# Patient Record
Sex: MALE | Race: BLACK OR AFRICAN AMERICAN
[De-identification: names, ages, dates, MRNs, and addresses within clinical notes are randomized per-mention and may not be internally consistent; named-entity substitution may affect disease eponyms.]

---

## 2017-04-13 ENCOUNTER — HOSPITAL ENCOUNTER (EMERGENCY)
Dept: HOSPITAL 62 - ER | Age: 37
Discharge: HOME | End: 2017-04-13
Payer: COMMERCIAL

## 2017-04-13 VITALS — DIASTOLIC BLOOD PRESSURE: 95 MMHG | SYSTOLIC BLOOD PRESSURE: 132 MMHG

## 2017-04-13 DIAGNOSIS — F17.210: ICD-10-CM

## 2017-04-13 DIAGNOSIS — M25.511: Primary | ICD-10-CM

## 2017-04-13 PROCEDURE — 99283 EMERGENCY DEPT VISIT LOW MDM: CPT

## 2017-04-13 PROCEDURE — 73030 X-RAY EXAM OF SHOULDER: CPT
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
## 2019-01-12 ENCOUNTER — HOSPITAL ENCOUNTER (EMERGENCY)
Dept: HOSPITAL 62 - ER | Age: 39
Discharge: HOME | End: 2019-01-12
Payer: SELF-PAY

## 2019-01-12 VITALS — SYSTOLIC BLOOD PRESSURE: 137 MMHG | DIASTOLIC BLOOD PRESSURE: 88 MMHG

## 2019-01-12 DIAGNOSIS — L03.811: Primary | ICD-10-CM

## 2019-01-12 DIAGNOSIS — H60.02: ICD-10-CM

## 2019-01-12 DIAGNOSIS — F17.200: ICD-10-CM

## 2019-01-12 PROCEDURE — 99282 EMERGENCY DEPT VISIT SF MDM: CPT

## 2019-01-12 NOTE — ER DOCUMENT REPORT
ED General





- General


Chief Complaint: Abscess


Stated Complaint: ABSCESS


Time Seen by Provider: 01/12/19 08:44


TRAVEL OUTSIDE OF THE [place] IN LAST 30 DAYS: No





- HPI


Patient complains to provider of: Abscess


Notes: 





Patient coming in for evaluation of an abscess behind the left ear.  Patient 

states a few days ago noticed a little knot behind the left ear patient states 

he continue to grow states last night his wife pressed on the wound causing and 

expressing pus to come out patient states exquisitely tender earlier this 

morning and now is red and warm.  Patient denies any fevers chills nausea 

vomiting diarrhea.  Patient denies history of MRSA in the past.  Patient denies 

any trauma to the area denies any ear pain patient resting comfortably upon my 

evaluation





- Related Data


Allergies/Adverse Reactions: 


                                        





No Known Allergies Allergy (Verified 01/12/19 08:17)


   











Past Medical History





- Social History


Smoking Status: Current Every Day Smoker


Chew tobacco use (# tins/day): No


Frequency of alcohol use: Social


Drug Abuse: None


Family History: Arthritis, CAD, CVA, Hyperlipidemia, Hypertension


Patient has suicidal ideation: No


Patient has homicidal ideation: No


Renal/ Medical History: Denies: Hx Peritoneal Dialysis





- Immunizations


Immunizations up to date: No


Hx Diphtheria, Pertussis, Tetanus Vaccination: No





Review of Systems





- Review of Systems


Constitutional: No symptoms reported


EENT: Other - Abscess


Cardiovascular: No symptoms reported


Respiratory: No symptoms reported


Gastrointestinal: No symptoms reported


Genitourinary: No symptoms reported


Male Genitourinary: No symptoms reported


Musculoskeletal: No symptoms reported


Skin: No symptoms reported


Hematologic/Lymphatic: No symptoms reported


Neurological/Psychological: No symptoms reported


-: Yes All other systems reviewed and negative





Physical Exam





- Vital signs


Vitals: 


                                        











Temp Pulse Resp BP Pulse Ox


 


 97.6 F   96   18   137/88 H  99 


 


 01/12/19 08:21  01/12/19 08:21  01/12/19 08:21  01/12/19 08:21  01/12/19 08:21











Interpretation: Normal





- General


General appearance: Appears well, Alert





- HEENT


Head: Normocephalic, Atraumatic


Eyes: Normal


Conjunctiva: Normal


Cornea: Normal


Eyelashes: Normal


Pupils: PERRL


Ears: Normal, Other - Behind the left ear around the postauricular area there is

significant erythema and tenderness to palpation there is signs of drainage from

it looks like to be possible lymph node versus abscess bedside ultrasound was 

performed showing no fluid collection that is drainable at this time


External canal: Normal


Tympanic membrane: Normal


Sinus: Normal


Nasal: Normal


Mouth/Lips: Normal


Mucous membranes: Normal


Pharynx: Normal


Neck: Normal.  No: Lymphadenopathy


Notes: 





Examination of the patient's oral cavity reveals severe dental disease with 

multiple dental caries





- Respiratory


Respiratory status: No respiratory distress


Chest status: Nontender


Breath sounds: Normal


Chest palpation: Normal





- Cardiovascular


Rhythm: Regular


Heart sounds: Normal auscultation


Murmur: No





- Abdominal


Inspection: Normal


Distension: No distension


Bowel sounds: Normal


Tenderness: Nontender


Organomegaly: No organomegaly





- Back


Back: Normal, Nontender





- Extremities


General upper extremity: Normal inspection, Nontender, Normal color, Normal ROM,

Normal temperature


General lower extremity: Normal inspection, Nontender, Normal color, Normal ROM,

Normal temperature, Normal weight bearing.  No: Kate's sign





- Neurological


Neuro grossly intact: Yes


Cognition: Normal


Orientation: AAOx4


[place] Coma Scale Eye Opening: Spontaneous


[place] Coma Scale Verbal: Oriented


[place] Coma Scale Motor: Obeys Commands


[place] Coma Scale Total: 15


Speech: Normal


Motor strength normal: LUE, RUE, LLE, RLE


Sensory: Normal





- Psychological


Associated symptoms: Normal affect, Normal mood





- Skin


Skin Temperature: Warm


Skin Moisture: Dry


Skin Color: Normal





Course





- Re-evaluation


Re-evalutation: 





01/12/19 09:43


Patient's history is consistent with an abscess versus an infected lymph node in

the postauricular area patient now has significant cellulitis of the area will 

give the patient a shot of Rocephin will start the patient on clindamycin for 

antibiotic coverage.  Otherwise examination is normal patient will be discharged

home follow-up primary care physician.





- Vital Signs


Vital signs: 


                                        











Temp Pulse Resp BP Pulse Ox


 


 97.6 F   96   18   137/88 H  99 


 


 01/12/19 08:21  01/12/19 08:21  01/12/19 08:21  01/12/19 08:21  01/12/19 08:21














Discharge





- Discharge


Clinical Impression: 


 Cellulitis and abscess of head





Condition: Good


Disposition: HOME, SELF-CARE


Instructions:  Abscess ([facility]), Cellulitis ([facility]), Clindamycin ([facility]), Oral Narcotic

Medication ([facility])


Additional Instructions: 


More likely he had an abscess last night that your wife drained pus out of them 

now have a skin infection or cellulitis we will treat this with antibiotic 

called clindamycin.  I will highly recommend she follow-up with your primary 

care physician return to the ER symptoms worsen.  You may take Tylenol and 

Motrin for your pain control Ultram for severe pain you may place warm packs to 

the area to help out with pain control as well.


Prescriptions: 


Ibuprofen [Motrin 600 mg Tablet] 600 mg PO Q8HP PRN #21 tablet


 PRN Reason: 


Clindamycin HCl [Cleocin 150 mg Capsule] 150 mg PO Q6 #40 capsule


Tramadol HCl [Ultram 50 mg Tablet] 50 mg PO ASDIR PRN #10 tablet


 PRN Reason: 


Forms:  Return to Work

## 2019-12-09 ENCOUNTER — HOSPITAL ENCOUNTER (OUTPATIENT)
Dept: HOSPITAL 62 - ER | Age: 39
Setting detail: OBSERVATION
LOS: 1 days | Discharge: HOME | End: 2019-12-10
Attending: INTERNAL MEDICINE | Admitting: INTERNAL MEDICINE
Payer: COMMERCIAL

## 2019-12-09 DIAGNOSIS — F19.11: ICD-10-CM

## 2019-12-09 DIAGNOSIS — Z23: ICD-10-CM

## 2019-12-09 DIAGNOSIS — R07.1: Primary | ICD-10-CM

## 2019-12-09 DIAGNOSIS — Z87.891: ICD-10-CM

## 2019-12-09 DIAGNOSIS — I25.10: ICD-10-CM

## 2019-12-09 DIAGNOSIS — F41.9: ICD-10-CM

## 2019-12-09 DIAGNOSIS — R06.4: ICD-10-CM

## 2019-12-09 DIAGNOSIS — Z95.5: ICD-10-CM

## 2019-12-09 DIAGNOSIS — Z79.02: ICD-10-CM

## 2019-12-09 DIAGNOSIS — Z82.49: ICD-10-CM

## 2019-12-09 DIAGNOSIS — Z86.74: ICD-10-CM

## 2019-12-09 DIAGNOSIS — D72.829: ICD-10-CM

## 2019-12-09 DIAGNOSIS — I25.2: ICD-10-CM

## 2019-12-09 DIAGNOSIS — Z79.82: ICD-10-CM

## 2019-12-09 DIAGNOSIS — I10: ICD-10-CM

## 2019-12-09 LAB
ADD MANUAL DIFF: NO
ALBUMIN SERPL-MCNC: 5.1 G/DL (ref 3.5–5)
ALP SERPL-CCNC: 98 U/L (ref 38–126)
ANION GAP SERPL CALC-SCNC: 18 MMOL/L (ref 5–19)
APPEARANCE UR: CLEAR
APTT PPP: (no result) S
AST SERPL-CCNC: 45 U/L (ref 17–59)
BARBITURATES UR QL SCN: NEGATIVE
BASOPHILS # BLD AUTO: 0.4 10^3/UL (ref 0–0.2)
BASOPHILS NFR BLD AUTO: 2.5 % (ref 0–2)
BILIRUB DIRECT SERPL-MCNC: 0.2 MG/DL (ref 0–0.4)
BILIRUB SERPL-MCNC: 0.7 MG/DL (ref 0.2–1.3)
BILIRUB UR QL STRIP: NEGATIVE
BUN SERPL-MCNC: 15 MG/DL (ref 7–20)
CALCIUM: 10.5 MG/DL (ref 8.4–10.2)
CHLORIDE SERPL-SCNC: 99 MMOL/L (ref 98–107)
CO2 SERPL-SCNC: 23 MMOL/L (ref 22–30)
EOSINOPHIL # BLD AUTO: 0.5 10^3/UL (ref 0–0.6)
EOSINOPHIL NFR BLD AUTO: 3.5 % (ref 0–6)
ERYTHROCYTE [DISTWIDTH] IN BLOOD BY AUTOMATED COUNT: 15.8 % (ref 11.5–14)
GLUCOSE SERPL-MCNC: 86 MG/DL (ref 75–110)
GLUCOSE UR STRIP-MCNC: NEGATIVE MG/DL
HCT VFR BLD CALC: 44.9 % (ref 37.9–51)
HGB BLD-MCNC: 15.4 G/DL (ref 13.5–17)
KETONES UR STRIP-MCNC: NEGATIVE MG/DL
LYMPHOCYTES # BLD AUTO: 4.2 10^3/UL (ref 0.5–4.7)
LYMPHOCYTES NFR BLD AUTO: 29.8 % (ref 13–45)
MCH RBC QN AUTO: 36.9 PG (ref 27–33.4)
MCHC RBC AUTO-ENTMCNC: 34.3 G/DL (ref 32–36)
MCV RBC AUTO: 108 FL (ref 80–97)
METHADONE UR QL SCN: NEGATIVE
MONOCYTES # BLD AUTO: 0.8 10^3/UL (ref 0.1–1.4)
MONOCYTES NFR BLD AUTO: 5.8 % (ref 3–13)
NEUTROPHILS # BLD AUTO: 8.2 10^3/UL (ref 1.7–8.2)
NEUTS SEG NFR BLD AUTO: 58.4 % (ref 42–78)
NITRITE UR QL STRIP: NEGATIVE
PCP UR QL SCN: NEGATIVE
PH UR STRIP: 6 [PH] (ref 5–9)
PLATELET # BLD: 434 10^3/UL (ref 150–450)
POTASSIUM SERPL-SCNC: 4 MMOL/L (ref 3.6–5)
PROT SERPL-MCNC: 8.5 G/DL (ref 6.3–8.2)
PROT UR STRIP-MCNC: NEGATIVE MG/DL
RBC # BLD AUTO: 4.18 10^6/UL (ref 4.35–5.55)
SP GR UR STRIP: 1.01
TOTAL CELLS COUNTED % (AUTO): 100 %
URINE AMPHETAMINES SCREEN: NEGATIVE
URINE BENZODIAZEPINES SCREEN: NEGATIVE
URINE COCAINE SCREEN: NEGATIVE
URINE MARIJUANA (THC) SCREEN: NEGATIVE
UROBILINOGEN UR-MCNC: NEGATIVE MG/DL (ref ?–2)
WBC # BLD AUTO: 14.1 10^3/UL (ref 4–10.5)

## 2019-12-09 PROCEDURE — 85025 COMPLETE CBC W/AUTO DIFF WBC: CPT

## 2019-12-09 PROCEDURE — 36415 COLL VENOUS BLD VENIPUNCTURE: CPT

## 2019-12-09 PROCEDURE — 93010 ELECTROCARDIOGRAM REPORT: CPT

## 2019-12-09 PROCEDURE — 96374 THER/PROPH/DIAG INJ IV PUSH: CPT

## 2019-12-09 PROCEDURE — 71045 X-RAY EXAM CHEST 1 VIEW: CPT

## 2019-12-09 PROCEDURE — 99285 EMERGENCY DEPT VISIT HI MDM: CPT

## 2019-12-09 PROCEDURE — 80053 COMPREHEN METABOLIC PANEL: CPT

## 2019-12-09 PROCEDURE — 93005 ELECTROCARDIOGRAM TRACING: CPT

## 2019-12-09 PROCEDURE — 90686 IIV4 VACC NO PRSV 0.5 ML IM: CPT

## 2019-12-09 PROCEDURE — 84484 ASSAY OF TROPONIN QUANT: CPT

## 2019-12-09 PROCEDURE — 96375 TX/PRO/DX INJ NEW DRUG ADDON: CPT

## 2019-12-09 PROCEDURE — G0378 HOSPITAL OBSERVATION PER HR: HCPCS

## 2019-12-09 PROCEDURE — 80307 DRUG TEST PRSMV CHEM ANLYZR: CPT

## 2019-12-09 PROCEDURE — 85652 RBC SED RATE AUTOMATED: CPT

## 2019-12-09 PROCEDURE — 84443 ASSAY THYROID STIM HORMONE: CPT

## 2019-12-09 PROCEDURE — 93306 TTE W/DOPPLER COMPLETE: CPT

## 2019-12-09 PROCEDURE — 81001 URINALYSIS AUTO W/SCOPE: CPT

## 2019-12-09 RX ADMIN — ACETAMINOPHEN PRN MG: 325 TABLET ORAL at 17:04

## 2019-12-09 RX ADMIN — ASPIRIN SCH MG: 81 TABLET, COATED ORAL at 10:05

## 2019-12-09 RX ADMIN — METOPROLOL TARTRATE SCH MG: 25 TABLET, FILM COATED ORAL at 21:37

## 2019-12-09 RX ADMIN — ASPIRIN 325 MG ORAL TABLET PRN MG: 325 PILL ORAL at 21:37

## 2019-12-09 RX ADMIN — Medication SCH: at 15:02

## 2019-12-09 RX ADMIN — CLOPIDOGREL BISULFATE SCH MG: 75 TABLET, FILM COATED ORAL at 10:05

## 2019-12-09 NOTE — EKG REPORT
SEVERITY:- BORDERLINE ECG -

SINUS RHYTHM

PROBABLE LEFT ATRIAL ABNORMALITY

BORDERLINE PROLONGED QT INTERVAL

BORDERLINE ID INTERVAL.

:

Confirmed by: Mateo Means MD 09-Dec-2019 08:08:22

## 2019-12-09 NOTE — RADIOLOGY REPORT (SQ)
CLINICAL HISTORY:  chest pain 



COMPARISON: None.



TECHNIQUE: XR CHEST 1 VIEW 12/9/2019 1:11 AM CST



FINDINGS: 



Cardiac silhouette is normal in size. Lungs are clear without

consolidation, atelectasis, mass or edema. There is no pleural

effusion. There is no pneumothorax. There are no acute osseous

findings.



IMPRESSION: 



Clear lungs.

## 2019-12-09 NOTE — ER DOCUMENT REPORT
ED Cardiac





- General


Stated Complaint: CHEST PAIN,SHORT OF BREATH


Time Seen by Provider: 12/09/19 01:05


Notes: 


Patient is a 39-year-old male that comes emergency department for chief 

complaint of chest pain.  He states chest pain started in the middle of his 

chest and towards the left side of his chest about 20 minutes prior to arrival, 

he states that the pain is making him feel short of breath and he started 

hyperventilating.  He denies nausea or vomiting, cough, fever/chills, injury.  

He states he just quit smoking.  Past medical history of hypertension and he is 

also reportedly on Plavix and aspirin, he states that he had a cardiac cath and 

stent performed at [facility] at the end of November.  He denies medical 

history otherwise.





TRAVEL OUTSIDE OF THE [place] IN LAST 30 DAYS: No





- Related Data


Allergies/Adverse Reactions: 


                                        





No Known Allergies Allergy (Verified 01/12/19 08:17)


   











Past Medical History





- General


Information source: Patient





- Social History


Smoking Status: Former Smoker


Lives with: Other - Incarcerated


Family History: Arthritis, CAD, CVA, Hyperlipidemia, Hypertension





- Past Medical History


Cardiac Medical History: Reports: Hx Coronary Artery Disease, Hx Heart Attack, 

Hx Hypertension


Renal/ Medical History: Denies: Hx Peritoneal Dialysis


Past Surgical History: Reports: Hx Cardiac Catheterization - Cardiac 

catheterization with drug-eluting stent in November 2019





- Immunizations


Hx Diphtheria, Pertussis, Tetanus Vaccination: Yes





Review of Systems





- Review of Systems


Constitutional: No symptoms reported


EENT: No symptoms reported


Cardiovascular: See HPI


Respiratory: See HPI


Gastrointestinal: No symptoms reported


Genitourinary: No symptoms reported


Male Genitourinary: No symptoms reported


Musculoskeletal: No symptoms reported


Skin: No symptoms reported


Hematologic/Lymphatic: No symptoms reported


Neurological/Psychological: See HPI





Physical Exam





- Vital signs


Vitals: 


                                        











Temp Pulse Resp Pulse Ox


 


 97.4 F   79   22 H  100 


 


 12/09/19 01:12  12/09/19 01:12  12/09/19 01:12  12/09/19 01:12














- Notes


Notes: 





GENERAL: Alert, responsive, hyperventilating and anxious appearing


HEAD: Normocephalic, atraumatic.


EYES: Pupils equal, round, and reactive to light. Extraocular movements intact.


ENT: Oral mucosa moist, tongue midline. Oropharynx unremarkable. Airway patent. 

Nares patent, no nasal septal hematoma, TM's intact.


NECK: Full range of motion. Supple. Trachea midline.


LUNGS: Clear to auscultation bilaterally, no wheezes, rales, or rhonchi.  

Hyperventilating.  There is some mild tenderness along the parasternal areas 

bilaterally without erythema, swelling, or severe tenderness.


HEART: Regular rate and rhythm. No murmur


ABDOMEN: Soft, non-tender. Non-distended. 


EXTREMITIES: Moves all 4 extremities spontaneously. No edema, normal radial and 

dorsalis pedis pulses bilaterally. No cyanosis.


BACK: no cervical, thoracic, lumbar midline tenderness. No saddle anesthesia, 

normal distal neurovascular exam. Moves all extremities in full range of motion.


NEUROLOGICAL: Alert and oriented x3. Normal speech. Cranial nerves II through 

XII grossly intact. 


PSYCH: Hyperventilating and anxious appearing


SKIN: Warm, dry, normal turgor. No rashes or lesions noted.





Course





- Re-evaluation


Re-evalutation: 


Patient initially hyperventilating, appears anxious and states he is feeling s

harp pain in the chest radiating to the left side.  Patient given aspirin, work-

up initiated, will closely reevaluate.  His vital signs are normal.





EKG without concerning findings, chest x-ray unremarkable.  I reevaluated 

patient, he is much calmer, pain is almost resolved, he is no longer hyperven

tilating.  He states he became anxious because of his recent experience with his

heart.  





Troponin negative.  CBC shows mild leukocytosis, nonspecific.  Chemistry 

unremarkable.  I did obtain records from [facility], this does confirm that 

patient had a STEMI, V. fib arrest, 2 minutes of chest compressions, and had a 

cardiac catheterization with a stent placed.  He states that he missed the [facility]

low-up to cardiology because he was told it would be $300.  He states pain is 

still minimally there.  He does also have some parasternal chest wall tenderness

although this is hard to say that it is the same pain he was feeling because 

pain was mainly spreading out over the left side of the chest when he felt it.  

Discussed with Dr. Carey.  Because of his recent cardiac history 

recommendation is to admit to telemetry observation.





After the small dose of morphine the left-sided radiating pain is resolved, only

the chest wall pain remains and this is mild and mainly with palpation.  Patient

does distinguish between the two.





Discussed with Dr. Baez, he recommends repeat troponin.





Discussed with Dr. Baez again, second troponin negative, patient has been 

reevaluated bedside without change, patient accepted to telemetry observation.





- Vital Signs


Vital signs: 


                                        











Temp Pulse Resp BP Pulse Ox


 


 97.8 F   75   16   131/80 H  98 


 


 12/09/19 05:32  12/09/19 05:32  12/09/19 05:32  12/09/19 05:32  12/09/19 05:32














- Laboratory


Result Diagrams: 


                                 12/09/19 01:15





                                 12/09/19 01:15


Laboratory results interpreted by me: 


                                        











  12/09/19 12/09/19





  01:15 01:15


 


WBC  14.1 H 


 


RBC  4.18 L 


 


MCV  108 H 


 


MCH  36.9 H 


 


RDW  15.8 H 


 


Baso % (Auto)  2.5 H 


 


Absolute Basos (auto)  0.4 H 


 


Calcium   10.5 H


 


Total Protein   8.5 H


 


Albumin   5.1 H














- EKG Interpretation by Me


Additional EKG results interpreted by me: 


Patient is EKG shows sinus rhythm at a rate of 83, QTC of 49, normal axis, no T 

wave inversions or ST segment changes in consecutive leads.  There is small 

amount of artifact present.





Discharge





- Discharge


Clinical Impression: 


Chest pain


Qualifiers:


 Chest pain type: chest pain on breathing Qualified Code(s): R07.1 - Chest pain 

on breathing





Condition: Stable


Disposition: ADMITTED AS OBSERVATION


Admitting Provider: Nestor (Hospitalist)


Unit Admitted: Telemetry

## 2019-12-09 NOTE — PDOC H&P
History of Present Illness


Admission Date/PCP: 


  12/09/19 06:07





  





Patient complains of: Chest pain


History of Present Illness: 


BAYRON BAINS is a 39 year old male with a past medical history of 

hypertension, tobacco, marijuana abuse and a V. fib arrest 21 days ago at 

[facility].  He received several minutes of CPR with successful resuscitation

he was taken to the Cath Lab and found to have an occluded coronary artery which

was stented.  He was discharged with aspirin and Plavix with scheduled follow-up

in the [place] office however has missed this appointment secondary to 

incarceration.  Patient admits 48 hours of missed medications prior to incarce

ration.  He presents with sharp retrosternal chest pain 2 out of 5 intensity 

lasting approximately 1 minute which is intermittent.  He is unable to identify 

alleviating factors however it is exacerbated by deep breathing and is 

associated with some shortness of breath with normal pulse oximetry.  In the 

emergency department he has an unremarkable work-up with exception to mild 

leukocytosis he is referred to the hospitalist for admission.  Patient has a 

notable anxious affect.





Past Medical History


Cardiac Medical History: Reports: Hyperlipidema, Hypertension


Psychiatric Medical History: Reports: Substance Abuse, Tobacco Dependency





Past Surgical History


Past Surgical History: Reports: Cardiac Catheterization, Coronary Stent





Social History


Information Source: Patient, Emergency Med Personnel, Outside Facility Records


Lives with: Other


Smoking Status: Former Smoker


Drugs: Marijuana





- Advance Directive


Resuscitation Status: Full Code





Family History


Family History: Arthritis, CAD, CVA, Hyperlipidemia, Hypertension


Parental Family History Reviewed: Yes


Children Family History Reviewed: Yes


Sibling(s) Family History Reviewed.: Yes





Medication/Allergy


Home Medications: 








Ondansetron [Zofran Odt 4 mg Tablet] 1 - 2 tab PO Q4H PRN #15 tab.rapdis 

04/03/15 


Promethazine HCl [Phenergan 25 mg Tablet] 1 - 2 tab PO Q6H PRN #15 tablet 

04/03/15 


Sucralfate [Carafate 1 gm Tablet] 1 gm PO ACHS #30 tablet 04/03/15 


Oxycodone HCl/Acetaminophen [Percocet 5-325 mg Tablet] 1 - 2 tab PO Q4H PRN #15 

tablet 12/18/16 


Hydrocodone/Acetaminophen [Norco 5-325 mg Tablet] 1 tab PO Q6HP PRN #10 tablet 

04/13/17 


Clindamycin HCl [Cleocin 150 mg Capsule] 150 mg PO Q6 #40 capsule 01/12/19 


Ibuprofen [Motrin 600 mg Tablet] 600 mg PO Q8HP PRN #21 tablet 01/12/19 


Tramadol HCl [Ultram 50 mg Tablet] 50 mg PO ASDIR PRN #10 tablet 01/12/19 








Allergies/Adverse Reactions: 


                                        





No Known Allergies Allergy (Verified 01/12/19 08:17)


   











Review of Systems


Constitutional: ABSENT: chills, fever(s), headache(s), weight gain, weight loss


Eyes: ABSENT: visual disturbances


Ears: ABSENT: hearing changes


Cardiovascular: ABSENT: chest pain, dyspnea on exertion, edema, orthropnea, 

palpitations


Respiratory: ABSENT: cough, hemoptysis


Gastrointestinal: ABSENT: abdominal pain, constipation, diarrhea, hematemesis, 

hematochezia, nausea, vomiting


Genitourinary: ABSENT: dysuria, hematuria


Musculoskeletal: ABSENT: joint swelling


Integumentary: ABSENT: rash, wounds


Neurological: ABSENT: abnormal gait, abnormal speech, confusion, dizziness, 

focal weakness, syncope


Psychiatric: ABSENT: anxiety, depression, homidical ideation, suicidal ideation


Endocrine: ABSENT: cold intolerance, heat intolerance, polydipsia, polyuria


Hematologic/Lymphatic: ABSENT: easy bleeding, easy bruising





Physical Exam


Vital Signs: 


                                        











Temp Pulse Resp BP Pulse Ox


 


 97.8 F   75   16   131/80 H  98 


 


 12/09/19 05:32  12/09/19 05:32  12/09/19 05:32  12/09/19 05:32  12/09/19 05:32








                                 Intake & Output











 12/07/19 12/08/19 12/09/19





 11:59 11:59 11:59


 


Weight   86.183 kg











General appearance: PRESENT: no acute distress, well-developed, well-nourished


Head exam: PRESENT: atraumatic, normocephalic


Eye exam: PRESENT: conjunctiva pink, EOMI, PERRLA.  ABSENT: scleral icterus


Ear exam: PRESENT: normal external ear exam


Mouth exam: PRESENT: moist, tongue midline


Neck exam: ABSENT: carotid bruit, JVD, lymphadenopathy, thyromegaly


Respiratory exam: PRESENT: clear to auscultation alem.  ABSENT: rales, rhonchi, 

wheezes


Cardiovascular exam: PRESENT: RRR.  ABSENT: diastolic murmur, rubs, systolic 

murmur


Pulses: PRESENT: normal dorsalis pedis pul


Vascular exam: PRESENT: normal capillary refill


GI/Abdominal exam: PRESENT: normal bowel sounds, soft.  ABSENT: distended, 

guarding, mass, organolmegaly, rebound, tenderness


Rectal exam: PRESENT: deferred


Extremities exam: PRESENT: full ROM.  ABSENT: calf tenderness, clubbing, pedal 

edema


Neurological exam: PRESENT: alert, awake, oriented to person, oriented to place,

oriented to time, oriented to situation, CN II-XII grossly intact.  ABSENT: 

motor sensory deficit


Psychiatric exam: PRESENT: appropriate affect, normal mood.  ABSENT: homicidal 

ideation, suicidal ideation


Skin exam: PRESENT: dry, intact, warm.  ABSENT: cyanosis, rash





Results


Laboratory Results: 


                                        





                                 12/09/19 01:15 





                                 12/09/19 01:15 





                                        











  12/09/19 12/09/19





  01:15 01:15


 


WBC  14.1 H 


 


RBC  4.18 L 


 


Hgb  15.4 


 


Hct  44.9 


 


MCV  108 H 


 


MCH  36.9 H 


 


MCHC  34.3 


 


RDW  15.8 H 


 


Plt Count  434 


 


Seg Neutrophils %  58.4 


 


Sodium   140.3


 


Potassium   4.0


 


Chloride   99


 


Carbon Dioxide   23


 


Anion Gap   18


 


BUN   15


 


Creatinine   0.93


 


Est GFR (African Amer)   > 60


 


Glucose   86


 


Calcium   10.5 H


 


Total Bilirubin   0.7


 


AST   45


 


Alkaline Phosphatase   98


 


Total Protein   8.5 H


 


Albumin   5.1 H








                                        











  12/09/19 12/09/19





  01:15 04:15


 


Troponin I  < 0.012  < 0.012











Impressions: 


                                        





Chest X-Ray  12/09/19 01:11


IMPRESSION: 


 


Clear lungs.


 














Assessment and Plan





- Diagnosis


(1) Coronary artery disease


Is this a current diagnosis for this admission?: Yes   


Plan: 


Continue dual antiplatelet therapy, Lopressor and atorvastatin








(2) Chest pain


Qualifiers: 


   Chest pain type: chest pain on breathing   Qualified Code(s): R07.1 - Chest 

pain on breathing; R07.81 - Pleurodynia   


Is this a current diagnosis for this admission?: Yes   


Plan: 


Telemetry observation for possible ACS however more likely atelectasis versus 

Dressler's.  Follow-up ESR and serial cardiac enzymes.








(3) Anxiety


Is this a current diagnosis for this admission?: Yes   


Plan: 


Possible cannabis withdrawal, supportive care follow-up urine drug screen








- Time


Time Spent with patient: 25-34 minutes





- Inpatient Certification


Medical Necessity: Need Close Monitoring Due to Risk of Patient Decompensation

## 2019-12-10 VITALS — SYSTOLIC BLOOD PRESSURE: 113 MMHG | DIASTOLIC BLOOD PRESSURE: 79 MMHG

## 2019-12-10 RX ADMIN — ASPIRIN SCH MG: 81 TABLET, COATED ORAL at 09:40

## 2019-12-10 RX ADMIN — ASPIRIN 325 MG ORAL TABLET PRN MG: 325 PILL ORAL at 16:17

## 2019-12-10 RX ADMIN — CLOPIDOGREL BISULFATE SCH MG: 75 TABLET, FILM COATED ORAL at 09:40

## 2019-12-10 RX ADMIN — ACETAMINOPHEN PRN MG: 325 TABLET ORAL at 04:25

## 2019-12-10 RX ADMIN — Medication SCH: at 04:29

## 2019-12-10 RX ADMIN — Medication SCH: at 13:03

## 2019-12-10 RX ADMIN — Medication SCH: at 06:40

## 2019-12-10 RX ADMIN — METOPROLOL TARTRATE SCH MG: 25 TABLET, FILM COATED ORAL at 09:39

## 2019-12-10 NOTE — PDOC DISCHARGE SUMMARY
Impression





- Admit/DC Date/PCP


Admission Date/Primary Care Provider: 


  12/09/19 06:07





  





Discharge Date: 12/10/19





- Assessment


Summary: 


Patient was admitted for evaluation of chest pain.  Troponins were trended 3 

times on the way on negative.  Patient was hemodynamically stable.  Patient 

received pain control and aspirin as well as Plavix.  EKG showed no significant 

new findings of ischemia or pericarditis.  Echocardiogram was performed which 

was normal with no evidence of pericardial effusions.  Case was discussed with 

cardiologist who recommended that patient be discharged to follow-up with his 

cardiologist at [facility] for further evaluation.  Patient is in stable 

condition and his chest pain is pretty much resolved and being discharged.





- Additional Information


Resuscitation Status: Full Code


Discharge Diet: Cardiac


Discharge Activity: Activity As Tolerated


Referrals: 


[facility] [Outside] - 12/12/19 4:00 pm (WITH DR. ORTEGA)


Prescriptions: 


Nitroglycerin [Nitrostat 0.4 mg (1/150 Gr) Tabs 25/Bottle] 1 tab SL Q5MP PRN #1 

bottle


 PRN Reason: For Chest Pain


Home Medications: 








Amlodipine Besylate [Norvasc 5 mg Tablet] 5 mg PO DAILY 12/09/19 


Aspirin [Ecotrin 81 mg EC Tablet] 81 mg PO DAILY 12/09/19 


Atorvastatin Calcium [Lipitor 40 mg Tablet] 40 mg PO QHS 12/09/19 


Clopidogrel Bisulfate [Plavix 75 mg Tablet] 75 mg PO DAILY 12/09/19 


Metoprolol Tartrate [Lopressor 25 mg Tablet] 25 mg PO Q12 12/09/19 


Nitroglycerin [Nitrostat 0.4 mg (1/150 Gr) Tabs 25/Bottle] 1 tab SL Q5MP PRN #1 

bottle 12/10/19 











History of Present Illiness


History of Present Illness: 


BAYRON BAINS is a 39 year old male with a past medical history of 

hypertension, tobacco, marijuana abuse and a V. fib arrest 21 days ago at 

[facility].  He received several minutes of CPR with successful resuscitation

he was taken to the Cath Lab and found to have an occluded coronary artery which

was stented.  He was discharged with aspirin and Plavix with scheduled follow-up

in the [place] office however has missed this appointment secondary to 

incarceration.  Patient admits 48 hours of missed medications prior to 

incarceration.  He presents with sharp retrosternal chest pain 2 out of 5 

intensity lasting approximately 1 minute which is intermittent.  He is unable to

identify alleviating factors however it is exacerbated by deep breathing and is 

associated with some shortness of breath with normal pulse oximetry.  In the 

emergency department he has an unremarkable work-up with exception to mild 

leukocytosis he is referred to the hospitalist for admission.  Patient has a 

notable anxious affect.











Physical Exam


Vital Signs: 


                                        











Temp Pulse Resp BP Pulse Ox


 


 98.3 F   69   16   113/92 H  100 


 


 12/10/19 12:20  12/10/19 12:20  12/10/19 12:20  12/10/19 12:20  12/10/19 12:20








                                 Intake & Output











 12/09/19 12/10/19 12/11/19





 06:59 06:59 06:59


 


Intake Total  714 


 


Balance  714 


 


Weight 86.183 kg 80.7 kg 











General appearance: PRESENT: no acute distress, cooperative


Respiratory exam: PRESENT: clear to auscultation alem


Cardiovascular exam: PRESENT: RRR, +S1, +S2.  ABSENT: diastolic murmur, systolic

murmur


Neurological exam: PRESENT: alert, awake





Results


Laboratory Results: 


                                        











WBC  14.1 10^3/uL (4.0-10.5)  H  12/09/19  01:15    


 


RBC  4.18 10^6/uL (4.35-5.55)  L  12/09/19  01:15    


 


Hgb  15.4 g/dL (13.5-17.0)   12/09/19  01:15    


 


Hct  44.9 % (37.9-51.0)   12/09/19  01:15    


 


MCV  108 fl (80-97)  H  12/09/19  01:15    


 


MCH  36.9 pg (27.0-33.4)  H  12/09/19  01:15    


 


MCHC  34.3 g/dL (32.0-36.0)   12/09/19  01:15    


 


RDW  15.8 % (11.5-14.0)  H  12/09/19  01:15    


 


Plt Count  434 10^3/uL (150-450)   12/09/19  01:15    


 


Lymph % (Auto)  29.8 % (13-45)   12/09/19  01:15    


 


Mono % (Auto)  5.8 % (3-13)   12/09/19  01:15    


 


Eos % (Auto)  3.5 % (0-6)   12/09/19  01:15    


 


Baso % (Auto)  2.5 % (0-2)  H  12/09/19  01:15    


 


Absolute Neuts (auto)  8.2 10^3/uL (1.7-8.2)   12/09/19  01:15    


 


Absolute Lymphs (auto)  4.2 10^3/uL (0.5-4.7)   12/09/19  01:15    


 


Absolute Monos (auto)  0.8 10^3/uL (0.1-1.4)   12/09/19  01:15    


 


Absolute Eos (auto)  0.5 10^3/uL (0.0-0.6)   12/09/19  01:15    


 


Absolute Basos (auto)  0.4 10^3/uL (0.0-0.2)  H  12/09/19  01:15    


 


Seg Neutrophils %  58.4 % (42-78)   12/09/19  01:15    


 


ESR  21 mm/hr (0-15)  H  12/09/19  06:42    


 


Sodium  140.3 mmol/L (137-145)   12/09/19  01:15    


 


Potassium  4.0 mmol/L (3.6-5.0)   12/09/19  01:15    


 


Chloride  99 mmol/L ()   12/09/19  01:15    


 


Carbon Dioxide  23 mmol/L (22-30)   12/09/19  01:15    


 


Anion Gap  18  (5-19)   12/09/19  01:15    


 


BUN  15 mg/dL (7-20)   12/09/19  01:15    


 


Creatinine  0.93 mg/dL (0.52-1.25)   12/09/19  01:15    


 


Est GFR ( Amer)  > 60  (>60)   12/09/19  01:15    


 


Est GFR (MDRD) Non-Af  > 60  (>60)   12/09/19  01:15    


 


Glucose  86 mg/dL ()   12/09/19  01:15    


 


Calcium  10.5 mg/dL (8.4-10.2)  H  12/09/19  01:15    


 


Total Bilirubin  0.7 mg/dL (0.2-1.3)   12/09/19  01:15    


 


Direct Bilirubin  0.2 mg/dL (0.0-0.4)   12/09/19  01:15    


 


Neonat Total Bilirubin  Not Reportable   12/09/19  01:15    


 


Neonat Direct Bilirubin  Not Reportable   12/09/19  01:15    


 


Neonat Indirect Bili  Not Reportable   12/09/19  01:15    


 


AST  45 U/L (17-59)   12/09/19  01:15    


 


ALT  43 U/L (<50)   12/09/19  01:15    


 


Alkaline Phosphatase  98 U/L ()   12/09/19  01:15    


 


Troponin I  < 0.012 ng/mL  12/09/19  22:10    


 


Total Protein  8.5 g/dL (6.3-8.2)  H  12/09/19  01:15    


 


Albumin  5.1 g/dL (3.5-5.0)  H  12/09/19  01:15    


 


TSH  2.43 uIU/mL (0.47-4.68)   12/09/19  01:15    


 


Urine Color  LIGHT YELLOW   12/09/19  10:02    


 


Urine Appearance  CLEAR   12/09/19  10:02    


 


Urine pH  6.0  (5.0-9.0)   12/09/19  10:02    


 


Ur Specific Gravity  1.009   12/09/19  10:02    


 


Urine Protein  NEGATIVE mg/dL (NEGATIVE)   12/09/19  10:02    


 


Urine Glucose (UA)  NEGATIVE mg/dL (NEGATIVE)   12/09/19  10:02    


 


Urine Ketones  NEGATIVE mg/dL (NEGATIVE)   12/09/19  10:02    


 


Urine Blood  NEGATIVE  (NEGATIVE)   12/09/19  10:02    


 


Urine Nitrite  NEGATIVE  (NEGATIVE)   12/09/19  10:02    


 


Urine Bilirubin  NEGATIVE  (NEGATIVE)   12/09/19  10:02    


 


Urine Urobilinogen  NEGATIVE mg/dL (<2.0)   12/09/19  10:02    


 


Ur Leukocyte Esterase  SMALL  (NEGATIVE)  H  12/09/19  10:02    


 


Urine WBC (Auto)  10 /HPF  12/09/19  10:02    


 


Urine RBC (Auto)  0 /HPF  12/09/19  10:02    


 


Squamous Epi Cells Auto  1 /HPF  12/09/19  10:02    


 


Urine Mucus (Auto)  RARE /LPF  12/09/19  10:02    


 


Urine Ascorbic Acid  NEGATIVE  (NEGATIVE)   12/09/19  10:02    


 


Urine Opiates Screen  NEGATIVE   12/09/19  01:15    


 


Urine Methadone Screen  NEGATIVE   12/09/19  01:15    


 


Ur Barbiturates Screen  NEGATIVE   12/09/19  01:15    


 


Ur Phencyclidine Scrn  NEGATIVE   12/09/19  01:15    


 


Ur Amphetamines Screen  NEGATIVE   12/09/19  01:15    


 


U Benzodiazepines Scrn  NEGATIVE   12/09/19  01:15    


 


Urine Cocaine Screen  NEGATIVE   12/09/19  01:15    


 


U Marijuana (THC) Screen  NEGATIVE   12/09/19  01:15    








                                        











  12/09/19 12/09/19 12/09/19





  01:15 04:15 11:01


 


Troponin I  < 0.012  < 0.012  < 0.012














  12/09/19 12/09/19





  17:11 22:10


 


Troponin I  < 0.012  < 0.012











Impressions: 


                                        





Chest X-Ray  12/09/19 01:11


IMPRESSION: 


 


Clear lungs.


 














Plan


Time Spent: Less than 30 Minutes





Stroke


Is this a Stroke Patient?: No





Acute Heart Failure





- **


Is this a Heart Failure Patient?: No

## 2019-12-10 NOTE — XCELERA REPORT
[address] [address]

                               Tel: [phone]

                               Fax: [phone]



                      Transthoracic Echocardiogram Report

_______________________________________________________________________________



Name: BAYRON BAINS

MRN: [id number]                            Age: 39 yrs

Gender: Male                               : 1980

Patient Status: Inpatient                  [facility]

Account #: [id number]

Study Date: 12/10/2019 10:43 AM

Accession #: [id number]

_______________________________________________________________________________



Height: 74 in        Weight: 190 lb        BSA: 2.1 m2

_______________________________________________________________________________

Procedure: A two-dimensional transthoracic echocardiogram with color flow and

Doppler was performed. Study Quality: Good.

Reason For Study: pleuritic CPain s/p PCI. r/o pericard effusion



History: pleuritic CPain s/p PCI. r/o pericard effusion.

Ordering Physician: MAO CHOU



Performed By: Katie Chin

_______________________________________________________________________________



Interpretation Summary

The left ventricle is normal in size.

There is moderate concentric left ventricular hypertrophy.

LV EF is 70%

Left ventricular systolic function is normal.

The left ventricular wall motion is normal.

There is no thrombus.

The right ventricle is normal in size and function.

The right atrium is normal.

The left atrial size is normal.

The interatrial septum is intact with no evidence for an atrial septal defect.

There is no Doppler evidence for an interatrial shunt

There is no evidence of mitral valve prolapse.

There is no vegetation seen on the mitral valve.

There is no mitral valve stenosis.

There is a trace amount of mitral regurgitation

There is no aortic valvular vegetation.

There is no aortic valve stenosis

There is no LVOT obstruction.

No aortic regurgitation is present.

There is no tricuspid stenosis.

There is a trace amount of tricuspid regurgitation

Tricuspid regurgitation jet envelope not well defined to measure RV systolic

pressure accurately.

There is no pulmonic valvular stenosis.

There is no pulmonic valvular regurgitation.

The aortic root is normal size.

The inferior vena cava appeared normal and decreased > 50% with respiration

(RAP 5-10 mmHg)

There is no pericardial effusion.



MMode/2D Measurements & Calculations

RVDd: 2.7 cm  LVIDd: 4.2 cm   FS: 44.2 %             Ao root diam: 2.6 cm



IVSd: 1.4 cm  LVIDs: 2.4 cm   EDV(Teich): 79.6 ml    Ao root area: 5.3 cm2

              LVPWd: 1.4 cm   ESV(Teich): 19.3 ml    LA dimension: 2.8 cm

                              EF(Teich): 75.8 %



Doppler Measurements & Calculations

MV E max lore:      MV dec slope:         Ao V2 max:        LV V1 max P.1 cm/sec        340.0 cm/sec2         125.4 cm/sec      4.2 mmHg

MV A max lore:      MV dec time: 0.23 sec Ao max PG:        LV V1 max:

68.9 cm/sec                              6.7 mmHg          102.4 cm/sec

MV E/A: 1.1

        _______________________________________________________________

PA V2 max:         PI end-d lore:

93.9 cm/sec        99.0 cm/sec

PA max PG: 3.5 mmHg





Left Ventricle

The left ventricle is normal in size. There is moderate concentric left

ventricular hypertrophy. LV EF is 70%. Left ventricular systolic function is

normal. Doppler measurements suggest normal left ventricular diastolic

function. The left ventricular wall motion is normal. There is no thrombus.

There is no ventricular septal defect visualized.



Right Ventricle

The right ventricle is normal in size and function.



Atria

The right atrium is normal. The left atrial size is normal. The interatrial

septum is intact with no evidence for an atrial septal defect. There is no

Doppler evidence for an interatrial shunt.



Mitral Valve

There is no evidence of mitral valve prolapse. There is no vegetation seen on

the mitral valve. There is no mitral valve stenosis. There is a trace amount

of mitral regurgitation.





Aortic Valve

There is no aortic valvular vegetation. There is no aortic valve stenosis.

There is no LVOT obstruction. No aortic regurgitation is present.



Tricuspid Valve

There is no tricuspid stenosis. There is a trace amount of tricuspid

regurgitation. Tricuspid regurgitation jet envelope not well defined to

measure RV systolic pressure accurately.



Pulmonic Valve

There is no pulmonic valvular stenosis. There is no pulmonic valvular

regurgitation.



Great Vessels

The aortic root is normal size. The inferior vena cava appeared normal and

decreased > 50% with respiration (RAP 5-10 mmHg).





Effusions

There is no pericardial effusion.



_______________________________________________________________________________

_______________________________________________________________________________



Electronically signed by:      Afshan Ritter      on 12/10/2019 02:16 PM



CC: MAO CHOU Lakshmi

## 2020-09-19 ENCOUNTER — HOSPITAL ENCOUNTER (EMERGENCY)
Dept: HOSPITAL 62 - ER | Age: 40
Discharge: HOME | End: 2020-09-19
Payer: MEDICAID

## 2020-09-19 VITALS — DIASTOLIC BLOOD PRESSURE: 89 MMHG | SYSTOLIC BLOOD PRESSURE: 132 MMHG

## 2020-09-19 DIAGNOSIS — I25.2: ICD-10-CM

## 2020-09-19 DIAGNOSIS — Z59.0: ICD-10-CM

## 2020-09-19 DIAGNOSIS — Z95.5: ICD-10-CM

## 2020-09-19 DIAGNOSIS — R45.851: ICD-10-CM

## 2020-09-19 DIAGNOSIS — I44.0: ICD-10-CM

## 2020-09-19 DIAGNOSIS — I10: ICD-10-CM

## 2020-09-19 DIAGNOSIS — F12.10: ICD-10-CM

## 2020-09-19 DIAGNOSIS — Z79.02: ICD-10-CM

## 2020-09-19 DIAGNOSIS — Z79.899: ICD-10-CM

## 2020-09-19 DIAGNOSIS — F32.9: Primary | ICD-10-CM

## 2020-09-19 DIAGNOSIS — F17.200: ICD-10-CM

## 2020-09-19 DIAGNOSIS — F10.10: ICD-10-CM

## 2020-09-19 DIAGNOSIS — Z79.82: ICD-10-CM

## 2020-09-19 DIAGNOSIS — Z63.5: ICD-10-CM

## 2020-09-19 DIAGNOSIS — I25.10: ICD-10-CM

## 2020-09-19 LAB
ADD MANUAL DIFF: (no result)
ALBUMIN SERPL-MCNC: 5 G/DL (ref 3.5–5)
ALP SERPL-CCNC: 120 U/L (ref 38–126)
ANION GAP SERPL CALC-SCNC: 13 MMOL/L (ref 5–19)
ANISOCYTOSIS BLD QL SMEAR: (no result)
APAP SERPL-MCNC: < 10 UG/ML (ref 10–30)
APPEARANCE UR: CLEAR
APTT PPP: (no result) S
AST SERPL-CCNC: 81 U/L (ref 17–59)
BARBITURATES UR QL SCN: NEGATIVE
BASOPHILS # BLD AUTO: 0.1 10^3/UL (ref 0–0.2)
BASOPHILS NFR BLD AUTO: 0.9 % (ref 0–2)
BILIRUB DIRECT SERPL-MCNC: 0.3 MG/DL (ref 0–0.4)
BILIRUB SERPL-MCNC: 0.8 MG/DL (ref 0.2–1.3)
BILIRUB UR QL STRIP: NEGATIVE
BUN SERPL-MCNC: 8 MG/DL (ref 7–20)
CALCIUM: 9.7 MG/DL (ref 8.4–10.2)
CHLORIDE SERPL-SCNC: 108 MMOL/L (ref 98–107)
CO2 SERPL-SCNC: 20 MMOL/L (ref 22–30)
DACRYOCYTES BLD QL SMEAR: SLIGHT
EOSINOPHIL # BLD AUTO: 0.3 10^3/UL (ref 0–0.6)
EOSINOPHIL NFR BLD AUTO: 1.9 % (ref 0–6)
ERYTHROCYTE [DISTWIDTH] IN BLOOD BY AUTOMATED COUNT: 18.1 % (ref 11.5–14)
ETHANOL SERPL-MCNC: 124 MG/DL
GLUCOSE SERPL-MCNC: 128 MG/DL (ref 75–110)
GLUCOSE UR STRIP-MCNC: NEGATIVE MG/DL
HCT VFR BLD CALC: 42.2 % (ref 37.9–51)
HGB BLD-MCNC: 14.8 G/DL (ref 13.5–17)
KETONES UR STRIP-MCNC: NEGATIVE MG/DL
LYMPHOCYTES # BLD AUTO: 3.3 10^3/UL (ref 0.5–4.7)
LYMPHOCYTES NFR BLD AUTO: 22.7 % (ref 13–45)
MCH RBC QN AUTO: 40.3 PG (ref 27–33.4)
MCHC RBC AUTO-ENTMCNC: 35.2 G/DL (ref 32–36)
MCV RBC AUTO: 115 FL (ref 80–97)
METHADONE UR QL SCN: NEGATIVE
MONOCYTES # BLD AUTO: 1 10^3/UL (ref 0.1–1.4)
MONOCYTES NFR BLD AUTO: 7.2 % (ref 3–13)
NEUTROPHILS # BLD AUTO: 9.7 10^3/UL (ref 1.7–8.2)
NEUTS SEG NFR BLD AUTO: 67.3 % (ref 42–78)
NITRITE UR QL STRIP: NEGATIVE
PCP UR QL SCN: NEGATIVE
PH UR STRIP: 5 [PH] (ref 5–9)
PLATELET # BLD: 353 10^3/UL (ref 150–450)
PLATELET COMMENT: ADEQUATE
POTASSIUM SERPL-SCNC: 3.5 MMOL/L (ref 3.6–5)
PROT SERPL-MCNC: 8.3 G/DL (ref 6.3–8.2)
PROT UR STRIP-MCNC: NEGATIVE MG/DL
RBC # BLD AUTO: 3.68 10^6/UL (ref 4.35–5.55)
SALICYLATES SERPL-MCNC: < 1 MG/DL (ref 2–20)
SP GR UR STRIP: 1
TOTAL CELLS COUNTED % (AUTO): 100 %
TOXIC GRANULES BLD QL SMEAR: (no result)
URINE AMPHETAMINES SCREEN: NEGATIVE
URINE BENZODIAZEPINES SCREEN: NEGATIVE
URINE COCAINE SCREEN: NEGATIVE
URINE MARIJUANA (THC) SCREEN: NEGATIVE
UROBILINOGEN UR-MCNC: NEGATIVE MG/DL (ref ?–2)
WBC # BLD AUTO: 14.5 10^3/UL (ref 4–10.5)
WBC TOXIC VACUOLES BLD QL SMEAR: PRESENT

## 2020-09-19 PROCEDURE — 85025 COMPLETE CBC W/AUTO DIFF WBC: CPT

## 2020-09-19 PROCEDURE — 81001 URINALYSIS AUTO W/SCOPE: CPT

## 2020-09-19 PROCEDURE — 80053 COMPREHEN METABOLIC PANEL: CPT

## 2020-09-19 PROCEDURE — 80307 DRUG TEST PRSMV CHEM ANLYZR: CPT

## 2020-09-19 PROCEDURE — 93005 ELECTROCARDIOGRAM TRACING: CPT

## 2020-09-19 PROCEDURE — 36415 COLL VENOUS BLD VENIPUNCTURE: CPT

## 2020-09-19 PROCEDURE — 99284 EMERGENCY DEPT VISIT MOD MDM: CPT

## 2020-09-19 PROCEDURE — 93010 ELECTROCARDIOGRAM REPORT: CPT

## 2020-09-19 SDOH — SOCIAL STABILITY - SOCIAL INSECURITY: DISRUPTION OF FAMILY BY SEPARATION AND DIVORCE: Z63.5

## 2020-09-19 SDOH — ECONOMIC STABILITY - HOUSING INSECURITY: HOMELESSNESS: Z59.0
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
## 2020-09-19 NOTE — ER DOCUMENT REPORT
Doctor's Note


Notes: 





09/19/20 14:55


Patient's  vital signs and previous labs, diagnostic images reviewed.  Reviewed 

mental health notes, nurse's notes and previous providers notes. VSS.  Pt is in 

no distress at this time. Denies any SI or  HI. 








General: A&Ox3.  Answers questions appropriately.


Heart:  RRR


Lungs: CTAB


Psych:  Flat affect





A/P:  Continue monitoring and rec's per MH.


Normal diet


will likely discharge home.

## 2020-09-19 NOTE — ER DOCUMENT REPORT
ED Psych Disorder / Suicide





- General


Chief Complaint: Psych Problem


Stated Complaint: PSYCH


Time Seen by Provider: 09/19/20 04:24


Notes: 


Patient is a 40-year-old male who comes emergency department for chief complaint

of depression, homelessness, suicidal ideations.  Patient states that I took a 

few of my meds tonight.  When I asked if he did this to commit suicide he states

that he "kind of did but then instead of taking more I walked up to a stranger 

and told him how he felt".  He states the stranger then called EMS.  When I 

asked patient what he over took patient will not tell me.  When I asked again 

what he took he states "a few of my prescribed medications".  Patient is 

prescribed amlodipine, Brilinta, metoprolol, and aspirin.  He has a history of 

hypertension, CAD.  Patient denies any symptoms, denies chest pain, dizziness, 

shortness of breath, nausea, vomiting, fever.  He states that he has been very 

depressed with his situation, he states he cannot see his wife, cannot see his 

children, [place], he states that he feels like there is nothing 

left and there is no point in going on.  He states that he is thinking about 

suicide and he feels like it "might be the way".  Patient denies being treated 

for depression, denies suicide attempts in the past.  Patient admits to 

marijuana use and frequent liquor, however he denies alcohol dependence or ever 

having alcohol withdrawals.  He smokes.





TRAVEL OUTSIDE OF THE [place] IN LAST 30 DAYS: No





- Related Data


Allergies/Adverse Reactions: 


                                        





No Known Allergies Allergy (Verified 09/19/20 04:06)


   











Past Medical History





- General


Information source: Patient





- Social History


Smoking Status: Current Every Day Smoker


Frequency of alcohol use: Heavy


Drug Abuse: Marijuana


Lives with: Alone


Family History: Arthritis, CAD, CVA, Hyperlipidemia, Hypertension


Patient has homicidal ideation: No





- Past Medical History


Cardiac Medical History: Reports: Hx Coronary Artery Disease, Hx Heart Attack, 

Hx Hypercholesterolemia, Hx Hypertension


Renal/ Medical History: Denies: Hx Peritoneal Dialysis


Past Surgical History: Reports: Hx Cardiac Catheterization - Cardiac 

catheterization with drug-eluting stent in November 2019, Hx Coronary Stent





- Immunizations


Immunizations up to date: No


Hx Diphtheria, Pertussis, Tetanus Vaccination: Yes





Review of Systems





- Review of Systems


Constitutional: No symptoms reported


EENT: No symptoms reported


Cardiovascular: No symptoms reported


Respiratory: No symptoms reported


Gastrointestinal: No symptoms reported


Genitourinary: No symptoms reported


Male Genitourinary: No symptoms reported


Musculoskeletal: No symptoms reported


Skin: No symptoms reported


Hematologic/Lymphatic: No symptoms reported


Neurological/Psychological: See HPI





Physical Exam





- Vital signs


Vitals: 





                                        











Temp Pulse Ox


 


 98.3 F   100 


 


 09/19/20 03:54  09/19/20 03:54














- Notes


Notes: 





GENERAL: Alert, interacts well. No acute distress.


HEAD: Normocephalic, atraumatic.


EYES: Pupils equal, round, and reactive to light. Extraocular movements intact.


ENT: Oral mucosa moist, tongue midline. Oropharynx unremarkable. Airway patent. 


NECK: Full range of motion. Supple. Trachea midline. No lymphadenopathy.


LUNGS: Clear to auscultation bilaterally, no wheezes, rales, or rhonchi. No 

respiratory distress. Non-tender chest wall. 


HEART: Regular rate and rhythm. No murmur


ABDOMEN: Soft, non-tender. Non-distended. 


EXTREMITIES: Moves all 4 extremities spontaneously. No edema, normal radial and 

dorsalis pedis pulses bilaterally. No cyanosis.


BACK: no cervical, thoracic, lumbar midline tenderness. No saddle anesthesia, 

normal distal neurovascular exam. Moves all extremities in full range of motion.


NEUROLOGICAL: Alert and oriented x3. Normal speech. Cranial nerves II through 

XII grossly intact. Strength 5/5 in all extremities. 


PSYCH: Patient with a forlorn expression, frequently becomes tearful, however he

does make good eye contact, does not appear to be responding to internal stimu

li, appears to have good insight, is very cooperative and polite.


SKIN: Warm, dry, normal turgor. No rashes or lesions noted.





Course





- Re-evaluation


Re-evalutation: 


Patient becomes tearful when discussing his life situation, states he is very 

depressed.  Initially he told me that he overdosed on his medications, then he 

amended the statement my stating he took a few extra medications, finally when 

asked to clarify this he does not report taking extra medications.   Vital signs

unremarkable, patient asymptomatic. Based on his evaluation and his amended 

statement I do not believe patient overdosed on his medications.  Patient is 

reporting suicidal ideations however, he states that he feels like there is no 

way out and that suicide would be the way to go.  He does report suicidal plan 

with plan of taking too many medications.  Patient states that when he saw 

nearby stranger he decided to go speak to them and they called EMS.  Is on his 

suicidal ideations with a plan, patient is been placed on IVC paperwork which 

was signed by Dr. Lundberg.





CBC with mild leukocytosis, nonspecific.  Chemistry shows slightly low potassium

, slightly low bicarbonate, slightly elevated LFTs, alcohol.  Remaining work-up 

unremarkable including salicylates (patient takes a daily aspirin).  Blood 

chemistry is consistent with frequent alcohol use, patient admits to frequently 

drinking liquor but he denies alcohol dependence or history of withdrawal.  EKG 

with no acute findings.  Patient asymptomatic again on reevaluation.  Patient is

medically cleared pending mental health evaluation.








- Vital Signs


Vital signs: 





                                        











Temp Pulse Resp BP Pulse Ox


 


 98.3 F      17   134/95 H  100 


 


 09/19/20 03:54     09/19/20 04:00  09/19/20 03:56  09/19/20 04:00














- Laboratory


Result Diagrams: 


                                 09/19/20 04:00





                                 09/19/20 04:00


Laboratory results interpreted by me: 





                                        











  09/19/20 09/19/20 09/19/20





  04:00 04:00 04:36


 


WBC  14.5 H  


 


RBC  3.68 L  


 


MCV  115 H  


 


MCH  40.3 H  


 


RDW  18.1 H  


 


Absolute Neuts (auto)  9.7 H  


 


Potassium   3.5 L 


 


Chloride   108 H 


 


Carbon Dioxide   20 L 


 


Glucose   128 H 


 


AST   81 H 


 


ALT   64 H 


 


Total Protein   8.3 H 


 


Ur Leukocyte Esterase    SMALL H


 


Salicylates   < 1.0 L 


 


Acetaminophen   < 10 L 














- EKG Interpretation by Me


Additional EKG results interpreted by me: 


EKG shows sinus rhythm at a rate of 85, QTC of 462, normal axis, first-degree AV

block with ND interval of 216, no T wave inversions or ST segment changes in 

consecutive leads.





Discharge





- Discharge


Clinical Impression: 


 Suicidal ideation, Alcohol abuse





Depression


Qualifiers:


 Depression Type: unspecified Qualified Code(s): F32.9 - Major depressive 

disorder, single episode, unspecified





Condition: Stable


Disposition: PSYCH HOSP/UNIT

## 2020-09-19 NOTE — EKG REPORT
SEVERITY:- ABNORMAL ECG -

SINUS RHYTHM

FIRST DEGREE AV BLOCK

:

Confirmed by: Yamel Pineda 19-Sep-2020 17:17:12

## 2020-09-21 LAB — PATH REV BLD -IMP: (no result)

## 2020-11-17 ENCOUNTER — HOSPITAL ENCOUNTER (EMERGENCY)
Dept: HOSPITAL 62 - ER | Age: 40
Discharge: HOME | End: 2020-11-17
Payer: COMMERCIAL

## 2020-11-17 VITALS — DIASTOLIC BLOOD PRESSURE: 105 MMHG | SYSTOLIC BLOOD PRESSURE: 122 MMHG

## 2020-11-17 DIAGNOSIS — I25.10: ICD-10-CM

## 2020-11-17 DIAGNOSIS — M25.512: ICD-10-CM

## 2020-11-17 DIAGNOSIS — F12.10: ICD-10-CM

## 2020-11-17 DIAGNOSIS — I10: ICD-10-CM

## 2020-11-17 DIAGNOSIS — D72.829: ICD-10-CM

## 2020-11-17 DIAGNOSIS — Z79.899: ICD-10-CM

## 2020-11-17 DIAGNOSIS — Z95.5: ICD-10-CM

## 2020-11-17 DIAGNOSIS — M25.521: ICD-10-CM

## 2020-11-17 DIAGNOSIS — I25.2: ICD-10-CM

## 2020-11-17 DIAGNOSIS — F17.210: ICD-10-CM

## 2020-11-17 DIAGNOSIS — M25.511: Primary | ICD-10-CM

## 2020-11-17 DIAGNOSIS — M25.522: ICD-10-CM

## 2020-11-17 LAB
ADD MANUAL DIFF: NO
ALBUMIN SERPL-MCNC: 5.2 G/DL (ref 3.5–5)
ALP SERPL-CCNC: 69 U/L (ref 38–126)
ANION GAP SERPL CALC-SCNC: 15 MMOL/L (ref 5–19)
APPEARANCE UR: CLEAR
APTT PPP: YELLOW S
AST SERPL-CCNC: 24 U/L (ref 17–59)
BARBITURATES UR QL SCN: NEGATIVE
BASOPHILS # BLD AUTO: 0.2 10^3/UL (ref 0–0.2)
BASOPHILS NFR BLD AUTO: 1 % (ref 0–2)
BILIRUB DIRECT SERPL-MCNC: 0.1 MG/DL (ref 0–0.4)
BILIRUB SERPL-MCNC: 0.7 MG/DL (ref 0.2–1.3)
BILIRUB UR QL STRIP: NEGATIVE
BUN SERPL-MCNC: 6 MG/DL (ref 7–20)
CALCIUM: 9.8 MG/DL (ref 8.4–10.2)
CHLORIDE SERPL-SCNC: 101 MMOL/L (ref 98–107)
CK SERPL-CCNC: 113 U/L (ref 55–170)
CO2 SERPL-SCNC: 26 MMOL/L (ref 22–30)
CRP SERPL-MCNC: < 5 MG/L (ref ?–10)
EOSINOPHIL # BLD AUTO: 0.1 10^3/UL (ref 0–0.6)
EOSINOPHIL NFR BLD AUTO: 0.8 % (ref 0–6)
ERYTHROCYTE [DISTWIDTH] IN BLOOD BY AUTOMATED COUNT: 17.2 % (ref 11.5–14)
ERYTHROCYTE [SEDIMENTATION RATE] IN BLOOD: 18 MM/HR (ref 0–15)
GLUCOSE SERPL-MCNC: 114 MG/DL (ref 75–110)
GLUCOSE UR STRIP-MCNC: NEGATIVE MG/DL
HCT VFR BLD CALC: 43.5 % (ref 37.9–51)
HGB BLD-MCNC: 14.8 G/DL (ref 13.5–17)
KETONES UR STRIP-MCNC: NEGATIVE MG/DL
LYMPHOCYTES # BLD AUTO: 2.9 10^3/UL (ref 0.5–4.7)
LYMPHOCYTES NFR BLD AUTO: 15.4 % (ref 13–45)
MCH RBC QN AUTO: 36.2 PG (ref 27–33.4)
MCHC RBC AUTO-ENTMCNC: 33.9 G/DL (ref 32–36)
MCV RBC AUTO: 107 FL (ref 80–97)
METHADONE UR QL SCN: NEGATIVE
MONOCYTES # BLD AUTO: 1.7 10^3/UL (ref 0.1–1.4)
MONOCYTES NFR BLD AUTO: 8.8 % (ref 3–13)
NEUTROPHILS # BLD AUTO: 14 10^3/UL (ref 1.7–8.2)
NEUTS SEG NFR BLD AUTO: 74 % (ref 42–78)
NITRITE UR QL STRIP: NEGATIVE
PCP UR QL SCN: NEGATIVE
PH UR STRIP: 8 [PH] (ref 5–9)
PLATELET # BLD: 410 10^3/UL (ref 150–450)
POTASSIUM SERPL-SCNC: 3.8 MMOL/L (ref 3.6–5)
PROT SERPL-MCNC: 8.7 G/DL (ref 6.3–8.2)
PROT UR STRIP-MCNC: 30 MG/DL
RBC # BLD AUTO: 4.08 10^6/UL (ref 4.35–5.55)
SP GR UR STRIP: 1.02
TOTAL CELLS COUNTED % (AUTO): 100 %
URINE AMPHETAMINES SCREEN: NEGATIVE
URINE BENZODIAZEPINES SCREEN: NEGATIVE
URINE COCAINE SCREEN: NEGATIVE
URINE MARIJUANA (THC) SCREEN: (no result)
UROBILINOGEN UR-MCNC: NEGATIVE MG/DL (ref ?–2)
WBC # BLD AUTO: 18.9 10^3/UL (ref 4–10.5)

## 2020-11-17 PROCEDURE — 80307 DRUG TEST PRSMV CHEM ANLYZR: CPT

## 2020-11-17 PROCEDURE — 85652 RBC SED RATE AUTOMATED: CPT

## 2020-11-17 PROCEDURE — 83735 ASSAY OF MAGNESIUM: CPT

## 2020-11-17 PROCEDURE — 93010 ELECTROCARDIOGRAM REPORT: CPT

## 2020-11-17 PROCEDURE — 84484 ASSAY OF TROPONIN QUANT: CPT

## 2020-11-17 PROCEDURE — 36415 COLL VENOUS BLD VENIPUNCTURE: CPT

## 2020-11-17 PROCEDURE — 82550 ASSAY OF CK (CPK): CPT

## 2020-11-17 PROCEDURE — 87040 BLOOD CULTURE FOR BACTERIA: CPT

## 2020-11-17 PROCEDURE — 86140 C-REACTIVE PROTEIN: CPT

## 2020-11-17 PROCEDURE — 87150 DNA/RNA AMPLIFIED PROBE: CPT

## 2020-11-17 PROCEDURE — 99284 EMERGENCY DEPT VISIT MOD MDM: CPT

## 2020-11-17 PROCEDURE — 87077 CULTURE AEROBIC IDENTIFY: CPT

## 2020-11-17 PROCEDURE — 93005 ELECTROCARDIOGRAM TRACING: CPT

## 2020-11-17 PROCEDURE — 96374 THER/PROPH/DIAG INJ IV PUSH: CPT

## 2020-11-17 PROCEDURE — 85025 COMPLETE CBC W/AUTO DIFF WBC: CPT

## 2020-11-17 PROCEDURE — 81001 URINALYSIS AUTO W/SCOPE: CPT

## 2020-11-17 PROCEDURE — 96361 HYDRATE IV INFUSION ADD-ON: CPT

## 2020-11-17 PROCEDURE — 80053 COMPREHEN METABOLIC PANEL: CPT

## 2020-11-17 NOTE — EKG REPORT
SEVERITY:- BORDERLINE ECG -

SINUS RHYTHM

PROBABLE LEFT ATRIAL ABNORMALITY

:

Confirmed by: Afshan Ritter MD 17-Nov-2020 15:19:36

## 2020-11-17 NOTE — ER DOCUMENT REPORT
Entered by NORMAN FAULKNER SCRIBE  11/17/20 0724 





Acting as scribe for:MARIANO PEREZ MD





ED General





- General


Chief Complaint: Shoulder Pain


Stated Complaint: BODY PAIN


Time Seen by Provider: 11/17/20 07:21


Mode of Arrival: Medic


Information source: Patient


Notes: 





This 40 year old male patient presents to the ED today via EMS with complaints 

of bilateral shoulder and elbow pain that started yesterday morning. Patient 

denies injury or heavy lifting. 





Patient has a history of CAD s/p stent placement in 11/2019. He reports that he 

was having chest pain about x3 weeks ago and had to have the stent "reinflated" 

on 10/25. Denies any chest pain at this time.





TRAVEL OUTSIDE OF THE [place] IN LAST 30 DAYS: No





- Related Data


Allergies/Adverse Reactions: 


                                        





No Known Allergies Allergy (Verified 11/17/20 03:09)


   








Home Medications: CARDIAC MEDS





Past Medical History





- General


Information source: Patient, [facility]





- Social History


Smoking Status: Current Every Day Smoker


Cigarette use (# per day): Yes - 2 cigarettes pd


Chew tobacco use (# tins/day): No


Smoking Education Provided: No


Frequency of alcohol use: None


Drug Abuse: Marijuana


Family History: Reviewed & Not Pertinent, Arthritis, CAD, CVA, Hyperlipidemia, 

Hypertension


Patient has suicidal ideation: No


Patient has homicidal ideation: No





- Past Medical History


Cardiac Medical History: Reports: Hx Coronary Artery Disease, Hx Heart Attack, 

Hx Hypercholesterolemia, Hx Hypertension


Past Surgical History: Reports: Hx Cardiac Catheterization - Cardiac 

catheterization with drug-eluting stent in November 2019, Hx Coronary Stent





- Immunizations


Immunizations up to date: No


Hx Diphtheria, Pertussis, Tetanus Vaccination: Yes





Review of Systems





- Review of Systems


Constitutional: No symptoms reported


EENT: No symptoms reported


Cardiovascular: See HPI.  denies: Chest pain


Respiratory: No symptoms reported


Gastrointestinal: No symptoms reported


Genitourinary: No symptoms reported


Male Genitourinary: No symptoms reported


Musculoskeletal: See HPI, Joint pain


Skin: No symptoms reported


Hematologic/Lymphatic: No symptoms reported


Neurological/Psychological: No symptoms reported


-: Yes All other systems reviewed and negative





Physical Exam





- Vital signs


Vitals: 


                                        











Temp Pulse Resp BP Pulse Ox


 


 98.5 F   81   18   129/88 H  99 


 


 11/17/20 02:39  11/17/20 02:39  11/17/20 02:39  11/17/20 02:39  11/17/20 02:39











Interpretation: Normal





- General


General appearance: Alert


In distress: None





- HEENT


Head: Normocephalic, Atraumatic


Eyes: Normal


Pupils: PERRL





- Respiratory


Respiratory status: No respiratory distress


Chest status: Nontender


Breath sounds: Normal


Chest palpation: Normal





- Cardiovascular


Rhythm: Regular


Heart sounds: Normal auscultation


Murmur: No





- Abdominal


Inspection: Normal


Distension: No distension


Bowel sounds: Normal


Tenderness: Nontender - Abdomen soft


Organomegaly: No organomegaly





- Back


Back: Normal, Nontender





- Extremities


General lower extremity: Normal inspection.  No: Edema


Shoulder: Tender - Tenderness to light palpation of bilateral shouders and 

scapular ridges. Patient is able to take off his shirt without any difficulty or

limited ROM. No swelling appreciated.


Elbow: Tender - Tenderness to light palpation of bilateral lateral medial 

epicondyles.  No: Joint effusion





- Neurological


Neuro grossly intact: Yes


Orientation: AAOx4


[place] Coma Scale Eye Opening: Spontaneous


[place] Coma Scale Verbal: Oriented


[place] Coma Scale Motor: Obeys Commands


[place] Coma Scale Total: 15





- Psychological


Associated symptoms: Normal affect, Normal mood





- Skin


Skin Temperature: Warm


Skin Moisture: Dry


Skin Color: Normal





Course





- Re-evaluation


Re-evalutation: 





11/17/20 11:18


The patient has an unexplained leukocytosis with a white blood cell count 18,900

with 74 segs and no bands.  His sed rate is 18 and his CRP is undetectable.  His

total CK is 113.  The urine drug screen was positive for THC and opiates.  He 

denies taking anything other than aspirin this morning, when confronted with the

opiate drug screen he later said that he took a Percocet this morning.  I do not

think oxycodone is picked up by the urine drug screen here.  Review of the [place] controlled database shows he is not received prescriptions for any c

ontrolled substance other than tramadol in the last 3 to 4 years.


I told the patient that there is no obvious explanation for the type of pain he 

was feeling all over his body or the elevated white blood cell count with 

negative inflammatory markers.


He does state at this time the pain all over the body is gone and now it is only

in the left arm.


He will be discharged with a prescription for an NSAID and recommended to drink 

plenty of fluids, rest, and follow-up with his primary care provider.





- Vital Signs


Vital signs: 


                                        











Temp Pulse Resp BP Pulse Ox


 


 98.5 F   81   18   161/112 H  100 


 


 11/17/20 02:39  11/17/20 02:39  11/17/20 02:39  11/17/20 09:10  11/17/20 09:10














- Laboratory


Result Diagrams: 


                                 11/17/20 07:57





                                 11/17/20 07:57


Laboratory results interpreted by me: 


                                        











  11/17/20 11/17/20 11/17/20





  07:57 07:57 09:04


 


WBC  18.9 H  


 


RBC  4.08 L  


 


MCV  107 H  


 


MCH  36.2 H  


 


RDW  17.2 H  


 


Absolute Neuts (auto)  14.0 H  


 


Absolute Monos (auto)  1.7 H  


 


ESR  18 H  


 


BUN   6 L 


 


Glucose   114 H 


 


Total Protein   8.7 H 


 


Albumin   5.2 H 


 


Urine Protein    30 H














- EKG Interpretation by Me


EKG shows normal: Sinus rhythm, Axis, Intervals, QRS Complexes, ST-T Waves


Rate: Normal - 80


Rhythm: NSR


P Waves: LAE





Discharge





- Discharge


Clinical Impression: 


 Generalized pain





Leukocytosis


Qualifiers:


 Leukocytosis type: unspecified Qualified Code(s): D72.829 - Elevated white 

blood cell count, unspecified





Condition: Stable


Disposition: HOME, SELF-CARE


Additional Instructions: 


There was no clear explanation for the generalized tenderness you were 

experiencing this morning.


You were given a dose of an anti-inflammatory medication, and it seems to have 

improved the discomfort everywhere except your left arm.


You did have an elevated white blood cell count without any obvious source for 

infection.  Blood cultures were done to look for occult infection.


We will prescribe an anti-inflammatory pain medication to take for the next few 

days.  


You should take the naproxen anti-inflammatory medicine with a glass of milk or 

some food to help protect your stomach lining.





You should follow-up with your primary care provider if your pain symptoms do 

not improve.


You should return to the emergency room if you have any significant change in 

your discomfort, or if you begin running fevers.





RETURN TO THE EMERGENCY ROOM IF ANY NEW OR WORSENING SYMPTOMS.





You should take your blood pressure medications when you get home.


Prescriptions: 


Naproxen [Naprosyn] 500 mg PO BID #20 tablet





I personally performed the services described in the documentation, reviewed and

 edited the documentation which was dictated to the scribe in my presence, and 

it accurately records my words and actions.